# Patient Record
Sex: MALE | Race: WHITE
[De-identification: names, ages, dates, MRNs, and addresses within clinical notes are randomized per-mention and may not be internally consistent; named-entity substitution may affect disease eponyms.]

---

## 2017-04-10 ENCOUNTER — HOSPITAL ENCOUNTER (INPATIENT)
Dept: HOSPITAL 82 - ED | Age: 72
LOS: 2 days | Discharge: HOME | DRG: 191 | End: 2017-04-12
Attending: INTERNAL MEDICINE | Admitting: INTERNAL MEDICINE
Payer: MEDICARE

## 2017-04-10 VITALS — HEIGHT: 71 IN | WEIGHT: 173.75 LBS | BODY MASS INDEX: 24.32 KG/M2

## 2017-04-10 DIAGNOSIS — J20.9: ICD-10-CM

## 2017-04-10 DIAGNOSIS — Z87.891: ICD-10-CM

## 2017-04-10 DIAGNOSIS — Z99.81: ICD-10-CM

## 2017-04-10 DIAGNOSIS — J44.1: Primary | ICD-10-CM

## 2017-04-10 DIAGNOSIS — F41.9: ICD-10-CM

## 2017-04-10 DIAGNOSIS — J44.0: ICD-10-CM

## 2017-04-10 DIAGNOSIS — J96.10: ICD-10-CM

## 2017-04-10 LAB
ALBUMIN SERPL-MCNC: 4.4 G/DL (ref 3.2–5)
ALP SERPL-CCNC: 111 U/L (ref 38–126)
ALT SERPL-CCNC: 21 U/L (ref 11–66)
ANION GAP SERPL CALCULATED.3IONS-SCNC: 19 MMOL/L
APTT PPP: 30.7 SECONDS (ref 20–32.5)
AST SERPL-CCNC: 17 U/L (ref 19–48)
BUN SERPL-MCNC: 14 MG/DL (ref 8–23)
BUN/CREAT SERPL: 19
CALCIUM SERPL-MCNC: 9.9 MG/DL (ref 8.4–10.2)
CHLORIDE SERPL-SCNC: 102 MMOL/L (ref 95–108)
CO2 SERPL-SCNC: 28 MMOL/L (ref 22–30)
CREAT SERPL-MCNC: 0.7 MG/DL (ref 0.7–1.3)
ERYTHROCYTE [DISTWIDTH] IN BLOOD BY AUTOMATED COUNT: 13.2 % (ref 11.5–15.5)
GLUCOSE SERPL-MCNC: 93 MG/DL (ref 82–115)
HCT VFR BLD AUTO: 48.3 % (ref 39–50)
HGB BLD-MCNC: 15.2 G/DL (ref 14–18)
IMM GRANULOCYTES NFR BLD: 0.6 % (ref 0–1)
INR PPP: 0.9 RATIO (ref 0.7–1.3)
MANUAL DIFF BLD: YES
MCH RBC QN AUTO: 31.2 PG  CALC (ref 26–32)
MCHC RBC AUTO-ENTMCNC: 31.5 G/L CALC (ref 32–36)
MCV RBC AUTO: 99.2 FL  CALC (ref 80–100)
MONOCYTES NFR BLD MANUAL: 5 % (ref 2–13)
MYOGLOBIN SERPL-MCNC: 59 NG/ML (ref 0–121)
NEUTS BAND NFR BLD MANUAL: 5 % (ref 0–8)
NEUTS SEG NFR BLD MANUAL: 90 % (ref 42–76)
PLATELET # BLD AUTO: 401 THOU/UL (ref 130–400)
PLATELET BLD QL SMEAR: NORMAL
POTASSIUM SERPL-SCNC: 3.7 MMOL/L (ref 3.5–5.1)
PROT SERPL-MCNC: 7.8 G/DL (ref 6.3–8.2)
PROTHROMBIN TIME: 10.2 SECONDS (ref 9–12.5)
RBC # BLD AUTO: 4.87 MILL/UL (ref 4.7–6.1)
SODIUM SERPL-SCNC: 145 MMOL/L (ref 137–146)

## 2017-04-11 VITALS — DIASTOLIC BLOOD PRESSURE: 79 MMHG | SYSTOLIC BLOOD PRESSURE: 154 MMHG

## 2017-04-11 VITALS — DIASTOLIC BLOOD PRESSURE: 80 MMHG | SYSTOLIC BLOOD PRESSURE: 141 MMHG

## 2017-04-11 VITALS — SYSTOLIC BLOOD PRESSURE: 131 MMHG | DIASTOLIC BLOOD PRESSURE: 78 MMHG

## 2017-04-11 VITALS — DIASTOLIC BLOOD PRESSURE: 76 MMHG | SYSTOLIC BLOOD PRESSURE: 130 MMHG

## 2017-04-11 VITALS — DIASTOLIC BLOOD PRESSURE: 90 MMHG | SYSTOLIC BLOOD PRESSURE: 162 MMHG

## 2017-04-11 VITALS — DIASTOLIC BLOOD PRESSURE: 82 MMHG | SYSTOLIC BLOOD PRESSURE: 140 MMHG

## 2017-04-11 LAB
BILIRUB UR QL STRIP.AUTO: NEGATIVE
CLARITY UR: CLEAR
COLOR UR AUTO: YELLOW
ERYTHROCYTE [DISTWIDTH] IN BLOOD BY AUTOMATED COUNT: 13.2 % (ref 11.5–15.5)
GLUCOSE UR STRIP.AUTO-MCNC: NEGATIVE MG/DL
HCT VFR BLD AUTO: 41.5 % (ref 39–50)
HGB BLD-MCNC: 13.5 G/DL (ref 14–18)
HGB UR QL STRIP.AUTO: NEGATIVE
IMM GRANULOCYTES NFR BLD: 0.9 % (ref 0–1)
KETONES UR STRIP.AUTO-MCNC: 15 MG/DL
LEUKOCYTE ESTERASE UR QL STRIP.AUTO: NEGATIVE
LYMPHOCYTES NFR BLD MANUAL: 4 % (ref 15–41)
MANUAL DIFF BLD: YES
MCH RBC QN AUTO: 31.5 PG  CALC (ref 26–32)
MCHC RBC AUTO-ENTMCNC: 32.5 G/L CALC (ref 32–36)
MCV RBC AUTO: 97 FL  CALC (ref 80–100)
MONOCYTES NFR BLD MANUAL: 6 % (ref 2–13)
NEUTS SEG NFR BLD MANUAL: 90 % (ref 42–76)
NITRITE UR QL STRIP.AUTO: NEGATIVE
PH UR STRIP.AUTO: 6 [PH] (ref 4.5–8)
PLATELET # BLD AUTO: 312 THOU/UL (ref 130–400)
PROT UR QL STRIP.AUTO: NEGATIVE MG/DL
RBC # BLD AUTO: 4.28 MILL/UL (ref 4.7–6.1)
SP GR UR STRIP.AUTO: 1.02
UROBILINOGEN UR QL STRIP.AUTO: 1 E.U./DL

## 2017-04-11 NOTE — NUR
RECEIVED BEDSIDE REPORT FROM SHERRY EGAN. PT RESTING ON LEFT SIDE WITH EYES
CLOSED, AWAKENS EASILY. RESPS EVEN AND UNLABORED ON O2 VIA NC, TELE MONITOR IN
PLACE. UNABLE TO SPEAK IN FULL SENTENCES WITHOUT STOPPING FOR BREATH. PLAN OF
CARE DISCUSSED. SAFETY PRECAUTIONS REINFORCED. BED IN LOWEST POSITION WITH
WHEELS LOCKED. CALL LIGHT WITHIN REACH. ENCOURAGED PT TO CALL FOR ANY NEEDS.

## 2017-04-11 NOTE — NUR
PATIENT CAME TO THE FLOOR VIA STRETCHER ACCOMPANIED BY NURSE. PATIENT ASSISTED
TO BED AND ADMINNSION ASSESSMENT COMPLETED. PATIENT IS O2 DEPENDENT AND USES
3L NC AT HOME. OXYGEN AT 3L VIA NC APPLIED AND HUMIFIED. SALINE LOCK NOTED TO
LEFT AC.

## 2017-04-11 NOTE — NUR
awake. no acute resp distress. breath sounds diminished bilat. o2 cont per nc.
po fluids taken well. voids per urinal. fall precautions cont.

## 2017-04-11 NOTE — NUR
RESTING IN BED WITH EYES CLOSED, AWAKENS EASILY. RESPS EVEN AND UNLABORED AT
REST, TELE MONITOR IN PLACE. VOICES NO C/O AT THIS TIME. CALL LIGHT WITHIN
REACH.

## 2017-04-11 NOTE — NUR
IN HIGH FOWLERS EATING LUNCH. RESPS EVEN AND UNLABORED ON O2 VIA NC AT REST.
SHORTNESS OF BREATH NOTED WITH EXERTION OR WHEN ATTEMPTING TO SPEAK IN
COMPLETE SENTENCES. TELE MONITOR IN PLACE. VOICES NO C/O AT THIS TIME. CALL
LIGHT WITHIN REACH. WILL CONTINUE TO MONITOR.

## 2017-04-12 VITALS — SYSTOLIC BLOOD PRESSURE: 129 MMHG | DIASTOLIC BLOOD PRESSURE: 70 MMHG

## 2017-04-12 VITALS — SYSTOLIC BLOOD PRESSURE: 120 MMHG | DIASTOLIC BLOOD PRESSURE: 68 MMHG

## 2017-04-12 VITALS — SYSTOLIC BLOOD PRESSURE: 121 MMHG | DIASTOLIC BLOOD PRESSURE: 69 MMHG

## 2017-04-12 LAB
ANION GAP SERPL CALCULATED.3IONS-SCNC: 9 MMOL/L
BASOPHILS NFR BLD AUTO: 0 % (ref 0–3)
BUN SERPL-MCNC: 16 MG/DL (ref 8–23)
BUN/CREAT SERPL: 28
C DIFF TOX A+B STL QL: NEGATIVE
CALCIUM SERPL-MCNC: 8.8 MG/DL (ref 8.4–10.2)
CHLORIDE SERPL-SCNC: 104 MMOL/L (ref 95–108)
CO2 SERPL-SCNC: 30 MMOL/L (ref 22–30)
CREAT SERPL-MCNC: 0.6 MG/DL (ref 0.7–1.3)
EOSINOPHIL NFR BLD AUTO: 0 % (ref 0–8)
ERYTHROCYTE [DISTWIDTH] IN BLOOD BY AUTOMATED COUNT: 13.1 % (ref 11.5–15.5)
GLUCOSE SERPL-MCNC: 139 MG/DL (ref 82–115)
HCT VFR BLD AUTO: 38.6 % (ref 39–50)
HGB BLD-MCNC: 12.4 G/DL (ref 14–18)
IMM GRANULOCYTES NFR BLD: 0.8 % (ref 0–1)
LYMPHOCYTES NFR BLD: 3 % (ref 15–41)
MCH RBC QN AUTO: 31.5 PG  CALC (ref 26–32)
MCHC RBC AUTO-ENTMCNC: 32.1 G/L CALC (ref 32–36)
MCV RBC AUTO: 98 FL  CALC (ref 80–100)
MONOCYTES NFR BLD AUTO: 5 % (ref 2–13)
NEUTROPHILS # BLD AUTO: 20.81 THOU/UL (ref 1.82–7.42)
NEUTROPHILS NFR BLD AUTO: 91 % (ref 42–76)
PLATELET # BLD AUTO: 305 THOU/UL (ref 130–400)
POTASSIUM SERPL-SCNC: 4.3 MMOL/L (ref 3.5–5.1)
RBC # BLD AUTO: 3.94 MILL/UL (ref 4.7–6.1)
SODIUM SERPL-SCNC: 138 MMOL/L (ref 137–146)

## 2017-04-12 NOTE — NUR
PT IN HIGH FOWLERS EATING LUNCH. RESPS EVEN AND UNLABORED ON O2 VIA NC, TELE
MONITOR IN PLACE. DENIES PAIN OR DISCOMFORT. ALL NEEDS MET. CALL LIGHT WITHIN
REACH. WILL CONTINUE TO MONITOR.

## 2017-04-12 NOTE — NUR
RECEIVED BEDSIDE REPORT FROM MANI CISNEROS. PT RESTING ON LEFT SIDE WITH EYES
CLOSED, AWAKENS EASILY. RESPS EVEN AND UNLABORED ON O2 VIA NC, TELE MONITOR IN
PLACE. REPORTS FEELING BETTER TODAY, BREATHING EASIER. DENIES PAIN OR
DISCOMFORT. PLAN OF CARE DISCUSSED. SAFETY PRECAUTIONS REINFORCED. BED IN
LOWEST POSITION WITH WHEELS LOCKED. CALL LIGHT WITHIN REACH. WILL CONTINUE TO
MONITOR.

## 2017-04-12 NOTE — NUR
Discharge instructions given. Patient verbalizes understanding of same.
Discharged in stable condition via Wheelchair to Home with
family. All belongings sent with pt.

## 2017-04-16 NOTE — NUR
ED CULTURE
PHARMACY MEDICATION FOLLOW-UP
 
Patient was seen in ED on 04/11/17
Cultures were reviewed from: Blood
Patient was discharged with Rx for:AZITHROMYCIN FOR BRONCHITIS
C&S report came back with No Growth
 
PLAN:
Recommended: No Change
 
Comment:

## 2018-01-18 ENCOUNTER — HOSPITAL ENCOUNTER (EMERGENCY)
Dept: HOSPITAL 82 - ED | Age: 73
LOS: 1 days | Discharge: NURSING, REHAB OR LONG-TERM CARE | End: 2018-01-19
Payer: MEDICARE

## 2018-01-18 VITALS — WEIGHT: 157.41 LBS | BODY MASS INDEX: 22.04 KG/M2 | HEIGHT: 71 IN

## 2018-01-18 DIAGNOSIS — R33.9: ICD-10-CM

## 2018-01-18 DIAGNOSIS — I71.4: ICD-10-CM

## 2018-01-18 DIAGNOSIS — R00.0: ICD-10-CM

## 2018-01-18 DIAGNOSIS — R10.31: Primary | ICD-10-CM

## 2018-01-18 DIAGNOSIS — J90: ICD-10-CM

## 2018-01-18 DIAGNOSIS — R93.5: ICD-10-CM

## 2018-01-18 DIAGNOSIS — J44.1: ICD-10-CM

## 2018-01-18 DIAGNOSIS — R50.9: ICD-10-CM

## 2018-01-18 LAB
ALBUMIN SERPL-MCNC: 4 G/DL (ref 3.2–5)
ALP SERPL-CCNC: 95 U/L (ref 38–126)
ALT SERPL-CCNC: 25 U/L (ref 11–66)
AMYLASE SERPL-CCNC: 71 U/L (ref 30–110)
ANION GAP SERPL CALCULATED.3IONS-SCNC: 14 MMOL/L
AST SERPL-CCNC: 19 U/L (ref 19–48)
BASOPHILS NFR BLD AUTO: 0 % (ref 0–3)
BILIRUB UR QL STRIP.AUTO: NEGATIVE
BUN SERPL-MCNC: 16 MG/DL (ref 8–23)
BUN/CREAT SERPL: 24
CALCIUM SERPL-MCNC: 9.7 MG/DL (ref 8.4–10.2)
CHLORIDE SERPL-SCNC: 104 MMOL/L (ref 95–108)
CLARITY UR: CLEAR
CO2 SERPL-SCNC: 28 MMOL/L (ref 22–30)
COLOR UR AUTO: YELLOW
CREAT SERPL-MCNC: 0.7 MG/DL (ref 0.7–1.3)
EOSINOPHIL NFR BLD AUTO: 0 % (ref 0–8)
ERYTHROCYTE [DISTWIDTH] IN BLOOD BY AUTOMATED COUNT: 11.9 % (ref 11.5–15.5)
GLUCOSE SERPL-MCNC: 103 MG/DL (ref 82–115)
GLUCOSE UR STRIP.AUTO-MCNC: NEGATIVE MG/DL
HCT VFR BLD AUTO: 42.6 % (ref 39–50)
HGB BLD-MCNC: 13.6 G/DL (ref 14–18)
HGB UR QL STRIP.AUTO: (no result)
IMM GRANULOCYTES NFR BLD: 0.5 % (ref 0–1)
KETONES UR STRIP.AUTO-MCNC: NEGATIVE MG/DL
LEUKOCYTE ESTERASE UR QL STRIP.AUTO: NEGATIVE
LIPASE SERPL-CCNC: 50 U/L (ref 23–300)
LYMPHOCYTES NFR BLD: 8 % (ref 15–41)
MCH RBC QN AUTO: 31.8 PG  CALC (ref 26–32)
MCHC RBC AUTO-ENTMCNC: 31.9 G/L CALC (ref 32–36)
MCV RBC AUTO: 99.5 FL  CALC (ref 80–100)
MONOCYTES NFR BLD AUTO: 10 % (ref 2–13)
MYOGLOBIN SERPL-MCNC: 37 NG/ML (ref 0–121)
NEUTROPHILS # BLD AUTO: 15.6 THOU/UL (ref 1.82–7.42)
NEUTROPHILS NFR BLD AUTO: 81 % (ref 42–76)
NITRITE UR QL STRIP.AUTO: NEGATIVE
PH UR STRIP.AUTO: 7.5 [PH] (ref 4.5–8)
PLATELET # BLD AUTO: 397 THOU/UL (ref 130–400)
POTASSIUM SERPL-SCNC: 4.2 MMOL/L (ref 3.5–5.1)
PROT SERPL-MCNC: 7.1 G/DL (ref 6.3–8.2)
PROT UR QL STRIP.AUTO: NEGATIVE MG/DL
RBC # BLD AUTO: 4.28 MILL/UL (ref 4.7–6.1)
RBC #/AREA URNS HPF: (no result) RBC/HPF (ref 0–5)
SODIUM SERPL-SCNC: 141 MMOL/L (ref 137–146)
SP GR UR STRIP.AUTO: 1.02
UROBILINOGEN UR QL STRIP.AUTO: 0.2 E.U./DL
WBC #/AREA URNS HPF: (no result) WBC/HPF (ref 0–5)

## 2018-01-19 VITALS — SYSTOLIC BLOOD PRESSURE: 138 MMHG | DIASTOLIC BLOOD PRESSURE: 76 MMHG

## 2018-01-22 ENCOUNTER — HOSPITAL ENCOUNTER (EMERGENCY)
Dept: HOSPITAL 82 - ED | Age: 73
Discharge: NURSING, REHAB OR LONG-TERM CARE | End: 2018-01-22
Payer: MEDICARE

## 2018-01-22 VITALS — WEIGHT: 169.76 LBS | BODY MASS INDEX: 23.77 KG/M2 | HEIGHT: 71 IN

## 2018-01-22 VITALS — DIASTOLIC BLOOD PRESSURE: 101 MMHG | SYSTOLIC BLOOD PRESSURE: 145 MMHG

## 2018-01-22 DIAGNOSIS — J44.9: ICD-10-CM

## 2018-01-22 DIAGNOSIS — K21.9: ICD-10-CM

## 2018-01-22 DIAGNOSIS — J98.4: ICD-10-CM

## 2018-01-22 DIAGNOSIS — N32.1: Primary | ICD-10-CM

## 2018-01-22 LAB
AMORPH SED URNS QL MICRO: (no result) HPF
ANION GAP SERPL CALCULATED.3IONS-SCNC: 16 MMOL/L
BASOPHILS NFR BLD AUTO: 0 % (ref 0–3)
BILIRUB UR QL STRIP.AUTO: NEGATIVE
BUN SERPL-MCNC: 5 MG/DL (ref 8–23)
BUN/CREAT SERPL: 9
CALCIUM SERPL-MCNC: 9.6 MG/DL (ref 8.4–10.2)
CHLORIDE SERPL-SCNC: 104 MMOL/L (ref 95–108)
CLARITY UR: (no result)
CO2 SERPL-SCNC: 25 MMOL/L (ref 22–30)
COLOR UR AUTO: (no result)
CREAT SERPL-MCNC: 0.6 MG/DL (ref 0.7–1.3)
EOSINOPHIL NFR BLD AUTO: 1 % (ref 0–8)
ERYTHROCYTE [DISTWIDTH] IN BLOOD BY AUTOMATED COUNT: 11.9 % (ref 11.5–15.5)
GLUCOSE SERPL-MCNC: 114 MG/DL (ref 82–115)
GLUCOSE UR STRIP.AUTO-MCNC: NEGATIVE MG/DL
HCT VFR BLD AUTO: 39.2 % (ref 39–50)
HGB BLD-MCNC: 12.9 G/DL (ref 14–18)
HGB UR QL STRIP.AUTO: (no result)
IMM GRANULOCYTES NFR BLD: 0.5 % (ref 0–1)
KETONES UR STRIP.AUTO-MCNC: 40 MG/DL
LEUKOCYTE ESTERASE UR QL STRIP.AUTO: (no result)
LYMPHOCYTES NFR BLD: 6 % (ref 15–41)
MCH RBC QN AUTO: 32.1 PG  CALC (ref 26–32)
MCHC RBC AUTO-ENTMCNC: 32.9 G/L CALC (ref 32–36)
MCV RBC AUTO: 97.5 FL  CALC (ref 80–100)
MONOCYTES NFR BLD AUTO: 9 % (ref 2–13)
NEUTROPHILS # BLD AUTO: 12.96 THOU/UL (ref 1.82–7.42)
NEUTROPHILS NFR BLD AUTO: 84 % (ref 42–76)
NITRITE UR QL STRIP.AUTO: POSITIVE
PH UR STRIP.AUTO: 7.5 [PH] (ref 4.5–8)
PLATELET # BLD AUTO: 386 THOU/UL (ref 130–400)
POTASSIUM SERPL-SCNC: 4.1 MMOL/L (ref 3.5–5.1)
PROT UR QL STRIP.AUTO: >=300 MG/DL
RBC # BLD AUTO: 4.02 MILL/UL (ref 4.7–6.1)
RBC #/AREA URNS HPF: (no result) RBC/HPF (ref 0–5)
SODIUM SERPL-SCNC: 141 MMOL/L (ref 137–146)
SP GR UR STRIP.AUTO: 1.02
UROBILINOGEN UR QL STRIP.AUTO: 4 E.U./DL
WBC #/AREA URNS HPF: (no result) WBC/HPF (ref 0–5)

## 2018-07-01 ENCOUNTER — HOSPITAL ENCOUNTER (EMERGENCY)
Dept: HOSPITAL 82 - ED | Age: 73
Discharge: NURSING, REHAB OR LONG-TERM CARE | End: 2018-07-01
Payer: MEDICARE

## 2018-07-01 VITALS — WEIGHT: 130.07 LBS | HEIGHT: 71 IN | BODY MASS INDEX: 18.21 KG/M2

## 2018-07-01 VITALS — SYSTOLIC BLOOD PRESSURE: 125 MMHG | DIASTOLIC BLOOD PRESSURE: 71 MMHG

## 2018-07-01 DIAGNOSIS — K56.600: Primary | ICD-10-CM

## 2018-07-01 DIAGNOSIS — N40.0: ICD-10-CM

## 2018-07-01 DIAGNOSIS — K21.9: ICD-10-CM

## 2018-07-01 DIAGNOSIS — J44.9: ICD-10-CM

## 2018-07-01 DIAGNOSIS — Z93.3: ICD-10-CM

## 2018-07-01 LAB
ALBUMIN SERPL-MCNC: 3.3 G/DL (ref 3.2–5)
ALP SERPL-CCNC: 98 U/L (ref 38–126)
ALT SERPL-CCNC: 29 U/L (ref 11–66)
ANION GAP SERPL CALCULATED.3IONS-SCNC: 7 MMOL/L
AST SERPL-CCNC: 18 U/L (ref 19–48)
BASOPHILS NFR BLD AUTO: 0 % (ref 0–3)
BILIRUB UR QL STRIP.AUTO: NEGATIVE
BUN SERPL-MCNC: 11 MG/DL (ref 8–23)
BUN/CREAT SERPL: 24
CHLORIDE SERPL-SCNC: 98 MMOL/L (ref 95–108)
CLARITY UR: CLEAR
CO2 SERPL-SCNC: 36 MMOL/L (ref 22–30)
COLOR UR AUTO: YELLOW
CREAT SERPL-MCNC: 0.5 MG/DL (ref 0.7–1.3)
EOSINOPHIL NFR BLD AUTO: 1 % (ref 0–8)
ERYTHROCYTE [DISTWIDTH] IN BLOOD BY AUTOMATED COUNT: 13.8 % (ref 11.5–15.5)
GLUCOSE UR STRIP.AUTO-MCNC: NEGATIVE MG/DL
HCT VFR BLD AUTO: 45.9 % (ref 39–50)
HGB BLD-MCNC: 14 G/DL (ref 14–18)
HGB UR QL STRIP.AUTO: NEGATIVE
IMM GRANULOCYTES NFR BLD: 0.3 % (ref 0–1)
KETONES UR STRIP.AUTO-MCNC: NEGATIVE MG/DL
LEUKOCYTE ESTERASE UR QL STRIP.AUTO: NEGATIVE
LIPASE SERPL-CCNC: 41 U/L (ref 23–300)
LYMPHOCYTES NFR BLD: 8 % (ref 15–41)
MCH RBC QN AUTO: 30.9 PG  CALC (ref 26–32)
MCHC RBC AUTO-ENTMCNC: 30.5 G/L CALC (ref 32–36)
MCV RBC AUTO: 101.3 FL  CALC (ref 80–100)
MONOCYTES NFR BLD AUTO: 8 % (ref 2–13)
NEUTROPHILS # BLD AUTO: 12.39 THOU/UL (ref 1.82–7.42)
NEUTROPHILS NFR BLD AUTO: 82 % (ref 42–76)
NITRITE UR QL STRIP.AUTO: NEGATIVE
PH UR STRIP.AUTO: 8 [PH] (ref 4.5–8)
PLATELET # BLD AUTO: 317 THOU/UL (ref 130–400)
POTASSIUM SERPL-SCNC: 4.8 MMOL/L (ref 3.5–5.1)
PROT SERPL-MCNC: 6.4 G/DL (ref 6.3–8.2)
PROT UR QL STRIP.AUTO: NEGATIVE MG/DL
RBC # BLD AUTO: 4.53 MILL/UL (ref 4.7–6.1)
SODIUM SERPL-SCNC: 136 MMOL/L (ref 137–146)
SP GR UR STRIP.AUTO: 1.01
UROBILINOGEN UR QL STRIP.AUTO: 0.2 E.U./DL